# Patient Record
Sex: FEMALE | Race: BLACK OR AFRICAN AMERICAN | NOT HISPANIC OR LATINO | Employment: UNEMPLOYED | URBAN - METROPOLITAN AREA
[De-identification: names, ages, dates, MRNs, and addresses within clinical notes are randomized per-mention and may not be internally consistent; named-entity substitution may affect disease eponyms.]

---

## 2017-03-21 ENCOUNTER — HOSPITAL ENCOUNTER (EMERGENCY)
Facility: HOSPITAL | Age: 32
Discharge: HOME | End: 2017-03-21
Attending: EMERGENCY MEDICINE
Payer: MEDICAID

## 2017-03-21 VITALS
DIASTOLIC BLOOD PRESSURE: 86 MMHG | TEMPERATURE: 98 F | SYSTOLIC BLOOD PRESSURE: 121 MMHG | WEIGHT: 280 LBS | OXYGEN SATURATION: 97 % | RESPIRATION RATE: 20 BRPM | BODY MASS INDEX: 46.65 KG/M2 | HEIGHT: 65 IN | HEART RATE: 87 BPM

## 2017-03-21 DIAGNOSIS — M94.0 COSTOCHONDRITIS: ICD-10-CM

## 2017-03-21 DIAGNOSIS — J06.9 VIRAL URI: Primary | ICD-10-CM

## 2017-03-21 LAB
B-HCG UR QL: NEGATIVE
CTP QC/QA: YES

## 2017-03-21 PROCEDURE — 81025 URINE PREGNANCY TEST: CPT | Performed by: EMERGENCY MEDICINE

## 2017-03-21 PROCEDURE — 25000003 PHARM REV CODE 250: Performed by: PHYSICIAN ASSISTANT

## 2017-03-21 PROCEDURE — 93005 ELECTROCARDIOGRAM TRACING: CPT

## 2017-03-21 PROCEDURE — 99284 EMERGENCY DEPT VISIT MOD MDM: CPT

## 2017-03-21 RX ORDER — IBUPROFEN 600 MG/1
600 TABLET ORAL
Status: COMPLETED | OUTPATIENT
Start: 2017-03-21 | End: 2017-03-21

## 2017-03-21 RX ORDER — BENZONATATE 100 MG/1
100 CAPSULE ORAL
Status: COMPLETED | OUTPATIENT
Start: 2017-03-21 | End: 2017-03-21

## 2017-03-21 RX ORDER — BENZONATATE 100 MG/1
100 CAPSULE ORAL 3 TIMES DAILY PRN
Qty: 20 CAPSULE | Refills: 0 | Status: SHIPPED | OUTPATIENT
Start: 2017-03-21 | End: 2017-03-31

## 2017-03-21 RX ORDER — IBUPROFEN 600 MG/1
600 TABLET ORAL EVERY 6 HOURS PRN
Qty: 16 TABLET | Refills: 0 | Status: SHIPPED | OUTPATIENT
Start: 2017-03-21 | End: 2018-11-29 | Stop reason: SDUPTHER

## 2017-03-21 RX ADMIN — IBUPROFEN 600 MG: 600 TABLET, FILM COATED ORAL at 10:03

## 2017-03-21 RX ADMIN — BENZONATATE 100 MG: 100 CAPSULE ORAL at 10:03

## 2017-03-21 NOTE — ED AVS SNAPSHOT
[facility]  [place] [address]               Domo RICKETTS Gil   3/21/2017  9:35 PM   ED    Description:  Female : 1985   Department:  [facility]           Your Care was Coordinated By:     Provider Role From To    Janelle Chaves MD Attending Provider 17 1185 --    Medardo Lr PA-C Physician Assistant 17 --      Reason for Visit     Chest Pain           Diagnoses this Visit        Comments    Viral URI    -  Primary     Costochondritis           ED Disposition     None           To Do List           Follow-up Information     Schedule an appointment as soon as possible for a visit in 3 days to follow up.    Why:  For follow-up care        Go to [facility].    Specialty:  Emergency Medicine    Why:  If symptoms worsen    Contact information:    [place] [id number] [phone]       These Medications        Disp Refills Start End    benzonatate (TESSALON) 100 MG capsule 20 capsule 0 3/21/2017 3/31/2017    Take 1 capsule (100 mg total) by mouth 3 (three) times daily as needed for Cough. - Oral    ibuprofen (ADVIL,MOTRIN) 600 MG tablet 16 tablet 0 3/21/2017     Take 1 tablet (600 mg total) by mouth every 6 (six) hours as needed for Pain. - Oral      [facility]     [facility] -  Assistance  Registered nurses in the [facility] provide clinical advisement, health education, appointment booking, and other advisory services.  Call for this free service at [phone].             Medications           Message regarding Medications     Verify the changes and/or additions to your medication regime listed below are the same as discussed with your clinician today.  If any of these changes or additions are incorrect, please notify your healthcare provider.        START taking these NEW medications        Refills    benzonatate  "(TESSALON) 100 MG capsule 0    Sig: Take 1 capsule (100 mg total) by mouth 3 (three) times daily as needed for Cough.    Class: Print    Route: Oral    ibuprofen (ADVIL,MOTRIN) 600 MG tablet 0    Sig: Take 1 tablet (600 mg total) by mouth every 6 (six) hours as needed for Pain.    Class: Print    Route: Oral      These medications were administered today        Dose Freq    benzonatate capsule 100 mg 100 mg ED 1 Time    Sig: Take 1 capsule (100 mg total) by mouth ED 1 Time.    Class: Normal    Route: Oral    Cosign for Ordering: Required by Janelle Chaves MD    ibuprofen tablet 600 mg 600 mg ED 1 Time    Sig: Take 1 tablet (600 mg total) by mouth ED 1 Time.    Class: Normal    Route: Oral    Cosign for Ordering: Required by Janelle Chaves MD           Verify that the below list of medications is an accurate representation of the medications you are currently taking.  If none reported, the list may be blank. If incorrect, please contact your healthcare provider. Carry this list with you in case of emergency.           Current Medications     benzonatate (TESSALON) 100 MG capsule Take 1 capsule (100 mg total) by mouth 3 (three) times daily as needed for Cough.    ibuprofen (ADVIL,MOTRIN) 600 MG tablet Take 1 tablet (600 mg total) by mouth every 6 (six) hours as needed for Pain.           Clinical Reference Information           Your Vitals Were     BP Pulse Temp Resp Height Weight    175/77 88 98.8 °F (37.1 °C) (Oral) 20 5' 5" (1.651 m) 127 kg (280 lb)    SpO2 BMI             100% 46.59 kg/m2         Allergies as of 3/21/2017     No Known Allergies      Immunizations Administered on Date of Encounter - 3/21/2017     None      ED Micro, Lab, POCT     Start Ordered       Status Ordering Provider    03/21/17 2135 03/21/17 2134    Once,   Status:  Canceled      Canceled     03/21/17 2135 03/21/17 2134    Once,   Status:  Canceled      Canceled     03/21/17 2135 03/21/17 2134    Once,   Status:  Canceled      Canceled  "    03/21/17 2135 03/21/17 2134    Once,   Status:  Canceled      Canceled     03/21/17 2135 03/21/17 2134    Once,   Status:  Canceled      Canceled     03/21/17 2135 03/21/17 2134  POCT urine pregnancy  Once     Comments:  For women of childbearing age w/o hysterectomy.    Final result       ED Imaging Orders     Start Ordered       Status Ordering Provider    03/21/17 2135 03/21/17 2134  X-Ray Chest PA And Lateral  1 time imaging      Final result       Discharge References/Attachments     CHEST WALL PAIN, COSTOCHONDRITIS (ENGLISH)    URI, VIRAL, NO ABX (ADULT) (ENGLISH)      [facility] Sign-Up     Activating your [facility] account is as easy as 1-2-3!     1) Visit [email or web address], select Sign Up Now, enter this activation code and your date of birth, then select Next.  [email or web address]  Expires: 5/5/2017 10:48 PM      2) Create a username and password to use when you visit [facility] in the future and select a security question in case you lose your password and select Next.    3) Enter your e-mail address and click Sign Up!    Additional Information  If you have questions, please e-mail [email or web address] or call [phone] to talk to our [facility] staff. Remember, [facility] is NOT to be used for urgent needs. For medical emergencies, dial 911.          [facility] complies with applicable Federal civil rights laws and does not discriminate on the basis of race, color, national origin, age, disability, or sex.        Language Assistance Services     ATTENTION: Language assistance services are available, free of charge. Please call [phone].      ATENCIÓN: Si habla español, tiene a wheeler disposición servicios gratuitos de asistencia lingüística. Llame al [phone].     CHÚ Ý: N?u b?n nói Ti?ng Vi?t, có các d?ch v? h? tr? ngôn ng? mi?n phí dành cho b?n. G?i s? [phone].

## 2017-03-22 NOTE — ED PROVIDER NOTES
"Encounter Date: 3/21/2017    SCRIBE #1 NOTE: I, Simone Wilks, am scribing for, and in the presence of,  Medardo Lr PA-C. I have scribed the following portions of the note - Other sections scribed: ROS, HPI.       History     Chief Complaint   Patient presents with    Chest Pain     Pt reports having a cold x 1.5 weeks and started to have Chest pain since yesterday and SOB today      Review of patient's allergies indicates:  No Known Allergies  HPI Comments: CC: Chest Pain    HPI: Patient is a 31 y.o. F with no pertinent past medical history who presents to the ED for evaluation of acute onset, intermittent, "sharp" midsternal chest pain worse with coughing and improved by lying down x1 day and shortness of breath that began at rest. Pain is moderate and constant. No attempted treatment. She denies BLE pain, BLE edema, back pain, and/or neck pain. She reports having "a cold" x1.5 weeks. No oral contraception. No PMHx DVT, CAD, HTN. Patient is a non-smoker.      The history is provided by the patient. No  was used.     History reviewed. No pertinent past medical history.  History reviewed. No pertinent surgical history.  History reviewed. No pertinent family history.  Social History   Substance Use Topics    Smoking status: Never Smoker    Smokeless tobacco: None    Alcohol use No     Review of Systems   Constitutional: Negative for chills, diaphoresis and fever.   HENT: Negative for sore throat.    Eyes: Negative for redness.   Respiratory: Positive for cough (productive; clear sputum) and shortness of breath.    Cardiovascular: Positive for chest pain (midsternal). Negative for leg swelling.   Gastrointestinal: Negative for abdominal pain, diarrhea, nausea and vomiting.   Genitourinary: Negative for dysuria.   Musculoskeletal: Negative for back pain and neck pain.   Skin: Negative for rash.   Neurological: Negative for weakness, numbness and headaches.       Physical Exam   Initial Vitals "   BP Pulse Resp Temp SpO2   03/21/17 2100 03/21/17 2100 03/21/17 2100 03/21/17 2100 03/21/17 2100   175/77 88 20 98.8 °F (37.1 °C) 100 %     Physical Exam    Vitals reviewed.  Constitutional: She appears well-developed and well-nourished. She is not diaphoretic. No distress.   HENT:   Head: Normocephalic and atraumatic.   Right Ear: External ear normal.   Left Ear: External ear normal.   Nose: Nose normal.   Mouth/Throat: Oropharynx is clear and moist. No oropharyngeal exudate.   Eyes: Conjunctivae are normal. No scleral icterus.   Neck: Normal range of motion. Neck supple.   Cardiovascular: Normal rate, regular rhythm, normal heart sounds and intact distal pulses.   Pulmonary/Chest: Breath sounds normal. No respiratory distress. She has no wheezes. She has no rhonchi. She has no rales. She exhibits tenderness (midsternal).   Abdominal: Soft. She exhibits no distension and no mass. There is no tenderness. There is no rebound and no guarding.   Musculoskeletal: Normal range of motion. She exhibits tenderness. She exhibits no edema.   Midsternal, reproducible chest pain with palpation.   Lymphadenopathy:     She has no cervical adenopathy.   Neurological: She is alert and oriented to person, place, and time.   Skin: Skin is warm and dry.         ED Course   Procedures  Labs Reviewed   POCT URINE PREGNANCY             Medical Decision Making:   History:   Old Medical Records: I decided to obtain old medical records.    31-year-old female with obesity and no diagnosed medical history complains of cough x1.5 weeks with intermittent sternal chest pain with cough since yesterday.  She denies fever, leg pain or edema.  She presents well-appearing in no distress, afebrile, and hypertensive 175/77.  HEENT exam is unremarkable.  Lungs sounds are clear and equal with normal work of breathing.  Heart sounds are normal with no murmurs.  No JVD or peripheral edema.  She has mild sternal tenderness on palpation.  Abdomen soft  nontender.  EKG obtained shows normal sinus rhythm with T-wave inversions in leads 2 and aVF, consistent with previous EKGs.  No ST elevation or depression.  Normal axis and intervals.  Patient has no cardiac risk factors.  She is PERC negative.  Chest x-ray obtained shows normal cardiac silhouette and mediastinum with.  No evidence of pneumonia or pulmonary edema.  I doubt ACS, PE, pneumonia, pneumothorax, pericarditis, viral myocarditis.  Patient likely has costochondritis secondary to cough.  Will treat with NSAIDs and Tessalon for cough.  Patient discharged with return precautions and advised to follow-up with PCP.  Case discussed with Dr. Chaves.          Scribe Attestation:   Scribe #1: I performed the above scribed service and the documentation accurately describes the services I performed. I attest to the accuracy of the note.    Attending Attestation:           Physician Attestation for Scribe:  Physician Attestation Statement for Scribe #1: I, Medardo Lr PA-C, reviewed documentation, as scribed by Simone Wilks in my presence, and it is both accurate and complete.                 ED Course     Clinical Impression:   The primary encounter diagnosis was Viral URI. A diagnosis of Costochondritis was also pertinent to this visit.          Medardo Lr PA-C  03/22/17 0025

## 2017-03-22 NOTE — ED TRIAGE NOTES
Patient states she had a cold for 1 1/2 week with coughing. Patient states she started to have chest pain and shortness of breath yesterday. Patient describes her pain as sharp that comes and goes.

## 2017-08-28 ENCOUNTER — HOSPITAL ENCOUNTER (EMERGENCY)
Facility: HOSPITAL | Age: 32
Discharge: HOME | End: 2017-08-28
Attending: EMERGENCY MEDICINE
Payer: MEDICAID

## 2017-08-28 VITALS
OXYGEN SATURATION: 98 % | TEMPERATURE: 99 F | DIASTOLIC BLOOD PRESSURE: 63 MMHG | WEIGHT: 278 LBS | HEART RATE: 91 BPM | RESPIRATION RATE: 20 BRPM | HEIGHT: 66 IN | SYSTOLIC BLOOD PRESSURE: 125 MMHG | BODY MASS INDEX: 44.68 KG/M2

## 2017-08-28 DIAGNOSIS — S09.90XA HEAD INJURY DUE TO TRAUMA, INITIAL ENCOUNTER: ICD-10-CM

## 2017-08-28 DIAGNOSIS — R51.9 LEFT TEMPORAL HEADACHE: Primary | ICD-10-CM
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
## 2017-08-28 LAB
B-HCG UR QL: NEGATIVE
CTP QC/QA: YES

## 2017-08-28 PROCEDURE — 99284 EMERGENCY DEPT VISIT MOD MDM: CPT | Mod: 25

## 2017-08-28 PROCEDURE — 81025 URINE PREGNANCY TEST: CPT | Performed by: NURSE PRACTITIONER
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
## 2018-11-29 ENCOUNTER — HOSPITAL ENCOUNTER (EMERGENCY)
Facility: HOSPITAL | Age: 33
Discharge: HOME | End: 2018-11-29
Attending: EMERGENCY MEDICINE
Payer: MEDICAID

## 2018-11-29 VITALS
RESPIRATION RATE: 18 BRPM | TEMPERATURE: 99 F | OXYGEN SATURATION: 100 % | DIASTOLIC BLOOD PRESSURE: 65 MMHG | HEART RATE: 81 BPM | HEIGHT: 65 IN | BODY MASS INDEX: 46.65 KG/M2 | WEIGHT: 280 LBS | SYSTOLIC BLOOD PRESSURE: 116 MMHG

## 2018-11-29 DIAGNOSIS — R07.9 CHEST PAIN: ICD-10-CM

## 2018-11-29 DIAGNOSIS — R07.89 CHEST PAIN, NON-CARDIAC: Primary | ICD-10-CM

## 2018-11-29 PROCEDURE — 25000003 PHARM REV CODE 250: Performed by: EMERGENCY MEDICINE

## 2018-11-29 PROCEDURE — 93010 ELECTROCARDIOGRAM REPORT: CPT | Mod: ,,, | Performed by: INTERNAL MEDICINE

## 2018-11-29 PROCEDURE — 99284 EMERGENCY DEPT VISIT MOD MDM: CPT | Mod: 25

## 2018-11-29 PROCEDURE — 63600175 PHARM REV CODE 636 W HCPCS: Performed by: EMERGENCY MEDICINE

## 2018-11-29 PROCEDURE — 93005 ELECTROCARDIOGRAM TRACING: CPT

## 2018-11-29 RX ORDER — KETOROLAC TROMETHAMINE 30 MG/ML
30 INJECTION, SOLUTION INTRAMUSCULAR; INTRAVENOUS
Status: DISCONTINUED | OUTPATIENT
Start: 2018-11-29 | End: 2018-11-29

## 2018-11-29 RX ORDER — CYCLOBENZAPRINE HCL 10 MG
10 TABLET ORAL 3 TIMES DAILY PRN
Qty: 15 TABLET | Refills: 0 | Status: SHIPPED | OUTPATIENT
Start: 2018-11-29 | End: 2018-12-04

## 2018-11-29 RX ORDER — IBUPROFEN 600 MG/1
600 TABLET ORAL EVERY 6 HOURS PRN
Qty: 20 TABLET | Refills: 0 | Status: SHIPPED | OUTPATIENT
Start: 2018-11-29

## 2018-11-29 RX ORDER — IBUPROFEN 600 MG/1
600 TABLET ORAL
Status: COMPLETED | OUTPATIENT
Start: 2018-11-29 | End: 2018-11-29

## 2018-11-29 RX ADMIN — IBUPROFEN 600 MG: 600 TABLET, FILM COATED ORAL at 11:11

## 2018-11-29 NOTE — ED TRIAGE NOTES
"Pt here for right sided chest pain that began today. Pt reports feeling "heart racing" yesterday; resolved. Denies n/v/d, sob. Denies radiating.   "

## 2018-11-29 NOTE — DISCHARGE INSTRUCTIONS
Ibuprofen and Flexeril as directed.  Please return immediately if you get worse or if new problems develop.  Please follow-up with your primary care doctor this week.  Rest.

## 2018-11-29 NOTE — ED PROVIDER NOTES
"Encounter Date: 11/29/2018    SCRIBE #1 NOTE: I, Sunita Gonzalez, am scribing for, and in the presence of,  Doc Loera MD. I have scribed the following portions of the note - Other sections scribed: HPI, ROS.       History     Chief Complaint   Patient presents with    Chest Pain     reports "I been having a rapid heart rate for a day now. I thought it was anxiety and today I woke up with chest pain. I think it was anxiety because I just had a wedding." constant right side aching/tight chest pain since awakening from slumber; denies cough; denies trauma     CC: Chest Pain    HPI: This is a 34 y/o female with no pertinent PMHx who presents to the ED for emergent evaluation of acute onset well localized right upper sternum border chest pain that began today. Pt denies left sided chest pain. Pt rates pain as moderate (6/10) and constant. Pt reports that pain is worse with deep breaths, bending forward, and lying down. Pt reports palpitations yesterday that resolved, which she relates to anxiety. Pt also notes frontal headache. Pt denies hx of blood clots. Pt denies cough, SOB, fever, chills, otalgia, abdominal pain, nausea, vomiting, or diarrhea. No further symptoms reported.     Of note, pt denies smoking cigarettes or consuming EtOH. Pt denies known allergies. Pt denies being pregnant. Pt states, "I can't be pregnant, I just had my period." Pt declined pregnancy test before getting chest x-ray.      The history is provided by the patient. No  was used.     Review of patient's allergies indicates:  No Known Allergies  History reviewed. No pertinent past medical history.  Past Surgical History:   Procedure Laterality Date    ABDOMINAL SURGERY       History reviewed. No pertinent family history.  Social History     Tobacco Use    Smoking status: Never Smoker    Smokeless tobacco: Never Used   Substance Use Topics    Alcohol use: No    Drug use: No     Review of Systems   Constitutional: " Negative for chills and fever.   HENT: Negative for congestion, ear pain, rhinorrhea and sore throat.    Eyes: Negative for pain and visual disturbance.   Respiratory: Negative for cough and shortness of breath.    Cardiovascular: Positive for chest pain (well localized right upper sternum border).   Gastrointestinal: Negative for abdominal pain, diarrhea, nausea and vomiting.   Genitourinary: Negative for dysuria.   Musculoskeletal: Negative for back pain and neck pain.   Skin: Negative for rash.   Neurological: Positive for headaches (frontal).   All other systems reviewed and are negative.      Physical Exam     Initial Vitals [11/29/18 0823]   BP Pulse Resp Temp SpO2   135/63 80 20 98.6 °F (37 °C) 98 %      MAP       --         Physical Exam  The patient was examined specifically for the following:   General:No significant distress, Good color, Warm and dry. Head and neck:Scalp atraumatic, Neck supple. Neurological:Appropriate conversation, Gross motor deficits. Eyes:Conjugate gaze, Clear corneas. ENT: No epistaxis. Cardiac: Regular rate and rhythm, Grossly normal heart tones. Pulmonary: Wheezing, Rales. Gastrointestinal: Abdominal tenderness, Abdominal distention. Musculoskeletal: Extremity deformity, Apparent pain with range of motion of the joints. Skin: Rash.   The findings on examination were normal except for the following:  Patient's heart rate is 80.  The patient has regular rate and rhythm. Heart tones are normal. The chest is nontender. Distraction of the right arm anteriorly across the front of the body cause the patient to wince and cry out with pain. There is no rash.  The lungs are clear with equal breath sounds bilaterally.  The abdomen is nontender.  The patient is morbidly obese.   ED Course   Procedures  Labs Reviewed   POCT URINE PREGNANCY     EKG Readings: (Independently Interpreted)   This patient is in a normal sinus rhythm with a heart rate of 79.  The p.r. QRS and QT intervals are normal.   There is no definite evidence of acute myocardial infarction or malignant arrhythmia.       Imaging Results          X-Ray Chest PA And Lateral (Final result)  Result time 11/29/18 09:33:42    Final result by Nick Wilson MD (11/29/18 09:33:42)                 Impression:      Normal chest      Electronically signed by: Nick Wilson MD  Date:    11/29/2018  Time:    09:33             Narrative:    EXAMINATION:  XR CHEST PA AND LATERAL    CLINICAL HISTORY:  Chest pain, unspecified    TECHNIQUE:  PA and lateral views of the chest were performed.    COMPARISON:  The 03/21/2017    FINDINGS:  The heart size and the pulmonary vessels are normal.  Mediastinal contour is normal.  Lungs are clear.  No pleural fluid or pneumothorax.  Skeletal structures are intact.                              Medical decision making:  Given the above this patient has severe pain in her right pectoral chest worse with arm movement.  He can be reproduced during the physical examination distracting the right arm across the front of the body.  I believe that this is chest wall pain.  Chest x-ray is unremarkable EKG is unremarkable.  Patient's vital signs are stable.  The patient is young and healthy.  She is not short of breath. I doubt pulmonary embolus there is no pain or swelling lower extremities.  I will discharge her on ibuprofen to follow up with primary care.                 Scribe Attestation:   Scribe #1: I performed the above scribed service and the documentation accurately describes the services I performed. I attest to the accuracy of the note.    Attending Attestation:           Physician Attestation for Scribe:  Physician Attestation Statement for Scribe #1: I, Doc Loera MD, reviewed documentation, as scribed by Sunita Gonzalez in my presence, and it is both accurate and complete.                    Clinical Impression:   The primary encounter diagnosis was Chest pain, non-cardiac. A diagnosis of Chest pain  was also pertinent to this visit.                             oDc Loera MD  11/29/18 2841

## 2019-09-01 ENCOUNTER — HOSPITAL ENCOUNTER (EMERGENCY)
Facility: HOSPITAL | Age: 34
Discharge: HOME | End: 2019-09-01
Attending: EMERGENCY MEDICINE
Payer: MEDICAID

## 2019-09-01 VITALS
HEIGHT: 66 IN | OXYGEN SATURATION: 98 % | DIASTOLIC BLOOD PRESSURE: 65 MMHG | BODY MASS INDEX: 46.61 KG/M2 | TEMPERATURE: 98 F | RESPIRATION RATE: 19 BRPM | WEIGHT: 290 LBS | HEART RATE: 79 BPM | SYSTOLIC BLOOD PRESSURE: 120 MMHG

## 2019-09-01 DIAGNOSIS — L02.211 ABDOMINAL WALL ABSCESS: Primary | ICD-10-CM

## 2019-09-01 PROCEDURE — 10061 I&D ABSCESS COMP/MULTIPLE: CPT

## 2019-09-01 PROCEDURE — 99283 EMERGENCY DEPT VISIT LOW MDM: CPT | Mod: 25,ER

## 2019-09-01 PROCEDURE — 10060 I&D ABSCESS SIMPLE/SINGLE: CPT | Mod: ER

## 2019-09-01 PROCEDURE — 25000003 PHARM REV CODE 250: Mod: ER | Performed by: EMERGENCY MEDICINE

## 2019-09-01 RX ORDER — OXYCODONE AND ACETAMINOPHEN 5; 325 MG/1; MG/1
1 TABLET ORAL EVERY 4 HOURS PRN
Qty: 10 TABLET | Refills: 0 | Status: SHIPPED | OUTPATIENT
Start: 2019-09-01

## 2019-09-01 RX ORDER — LIDOCAINE HYDROCHLORIDE AND EPINEPHRINE 10; 10 MG/ML; UG/ML
1 INJECTION, SOLUTION INFILTRATION; PERINEURAL ONCE
Status: COMPLETED | OUTPATIENT
Start: 2019-09-01 | End: 2019-09-01

## 2019-09-01 RX ORDER — IBUPROFEN 400 MG/1
800 TABLET ORAL
Status: COMPLETED | OUTPATIENT
Start: 2019-09-01 | End: 2019-09-01

## 2019-09-01 RX ORDER — SULFAMETHOXAZOLE AND TRIMETHOPRIM 800; 160 MG/1; MG/1
1 TABLET ORAL
Status: COMPLETED | OUTPATIENT
Start: 2019-09-01 | End: 2019-09-01

## 2019-09-01 RX ORDER — SULFAMETHOXAZOLE AND TRIMETHOPRIM 800; 160 MG/1; MG/1
1 TABLET ORAL 2 TIMES DAILY
Qty: 14 TABLET | Refills: 0 | Status: SHIPPED | OUTPATIENT
Start: 2019-09-01 | End: 2019-09-08

## 2019-09-01 RX ORDER — IBUPROFEN 800 MG/1
800 TABLET ORAL EVERY 6 HOURS PRN
Qty: 20 TABLET | Refills: 0 | Status: SHIPPED | OUTPATIENT
Start: 2019-09-01

## 2019-09-01 RX ADMIN — LIDOCAINE HYDROCHLORIDE,EPINEPHRINE BITARTRATE 1 ML: 10; .01 INJECTION, SOLUTION INFILTRATION; PERINEURAL at 09:09

## 2019-09-01 RX ADMIN — IBUPROFEN 800 MG: 400 TABLET ORAL at 09:09

## 2019-09-01 RX ADMIN — SULFAMETHOXAZOLE AND TRIMETHOPRIM 1 TABLET: 800; 160 TABLET ORAL at 09:09

## 2019-09-01 NOTE — DISCHARGE INSTRUCTIONS
See your doctor or return to the emergency room in 2 days for a recheck.  Return sooner if your symptoms worsen.

## 2019-09-01 NOTE — ED PROVIDER NOTES
Encounter Date: 9/1/2019    SCRIBE #1 NOTE: I, Satinder Galindo, am scribing for, and in the presence of,  Dr. Bravo. I have scribed the following portions of the note - Other sections scribed: HPI, ROS, PE.       History     Chief Complaint   Patient presents with    Abscess     to lower abd, onset 2 days, denies drainage     33 year old female complaining of painful boil to lower abdomen x 2 days. She denies fever or drainage.  Patient reports having similar problem 4-5 months ago in which it went away on its own. She has not taken any medication for pain.     The history is provided by the patient.     Review of patient's allergies indicates:  No Known Allergies  History reviewed. No pertinent past medical history.  Past Surgical History:   Procedure Laterality Date    ABDOMINAL SURGERY       History reviewed. No pertinent family history.  Social History     Tobacco Use    Smoking status: Never Smoker    Smokeless tobacco: Never Used   Substance Use Topics    Alcohol use: No    Drug use: No     Review of Systems   Constitutional: Negative for fever.   Gastrointestinal: Positive for abdominal pain (due to boil). Negative for vomiting.        Painful boil to lower abdomen  No drainage   Skin: Positive for color change.        Abscess       Physical Exam     Initial Vitals [09/01/19 0844]   BP Pulse Resp Temp SpO2   138/81 92 19 98.5 °F (36.9 °C) 99 %      MAP       --         Physical Exam    Nursing note and vitals reviewed.  Constitutional: She appears well-developed and well-nourished. She is Obese .   HENT:   Head: Normocephalic and atraumatic.   Right Ear: External ear normal.   Left Ear: External ear normal.   Eyes: Conjunctivae are normal.   Neck: Neck supple.   Cardiovascular: Normal rate.   Pulmonary/Chest: No respiratory distress.   Abdominal: Soft.       Neurological: She is alert and oriented to person, place, and time.   Skin: Skin is warm and dry.   Psychiatric: She has a normal mood and  affect.         ED Course   I & D - Incision and Drainage  Date/Time: 9/1/2019 9:01 AM  Performed by: Abby Bravo MD  Authorized by: Abby Bravo MD   Consent Done: Yes  Consent: Verbal consent obtained.  Risks and benefits: risks, benefits and alternatives were discussed  Consent given by: patient  Patient understanding: patient states understanding of the procedure being performed  Patient identity confirmed: verbally with patient  Type: abscess  Body area: trunk  Location details: abdomen    Anesthesia:  Local Anesthetic: lidocaine 1% with epinephrine  Anesthetic total: 2 mL  Patient sedated: no  Scalpel size: 11  Incision type: single straight  Complexity: complex  Drainage: pus and  bloody  Drainage amount: copious  Wound treatment: wound packed,  drainage and  incision  Packing material: 1/4 in iodoform gauze  Complications: No  Patient tolerance: Patient tolerated the procedure well with no immediate complications        Labs Reviewed - No data to display       Imaging Results    None          Medical Decision Making:   History:   Old Medical Records: I decided to obtain old medical records.  Initial Assessment:   33 year old female with boil to lower abdomen. Patient reports pain, but denies drainage or fever. Physical exam is significant for area of tenderness, swelling, and fluctuance to lower left side of abdomen.  ED Management:  Will treat with lidocaine-EPINEPHrine 1%-1:100,000 injection 1 mL, sulfamethoxazole-trimethoprim 800-160mg per tablet 1 tablet, and Ibuprofen tablet 800 mg.  Patient was instructed to return in 2 days for recheck or sooner if her symptoms worsen.  She will be discharged on Percocet and Bactrim.                Scribe Attestation:   Scribe #1: I performed the above scribed service and the documentation accurately describes the services I performed. I attest to the accuracy of the note.       I, Dr. Abby Bravo, personally performed the services described in this  documentation. All medical record entries made by the scribe were at my direction and in my presence.  I have reviewed the chart and agree that the record reflects my personal performance and is accurate and complete. Abby Bravo MD.  12:03 PM 09/01/2019             Clinical Impression:     1. Abdominal wall abscess                                   Abby Bravo MD  09/01/19 2759

## 2019-09-03 ENCOUNTER — HOSPITAL ENCOUNTER (EMERGENCY)
Facility: HOSPITAL | Age: 34
Discharge: HOME | End: 2019-09-03
Attending: EMERGENCY MEDICINE
Payer: MEDICAID

## 2019-09-03 VITALS
BODY MASS INDEX: 46.61 KG/M2 | DIASTOLIC BLOOD PRESSURE: 68 MMHG | SYSTOLIC BLOOD PRESSURE: 156 MMHG | OXYGEN SATURATION: 100 % | TEMPERATURE: 99 F | HEART RATE: 94 BPM | RESPIRATION RATE: 16 BRPM | HEIGHT: 66 IN | WEIGHT: 290 LBS

## 2019-09-03 DIAGNOSIS — Z51.89 WOUND CHECK, ABSCESS: Primary | ICD-10-CM

## 2019-09-03 PROCEDURE — 99281 EMR DPT VST MAYX REQ PHY/QHP: CPT | Mod: ER

## 2019-09-03 NOTE — ED PROVIDER NOTES
Encounter Date: 9/3/2019    SCRIBE #1 NOTE: I, Emily Cai, am scribing for, and in the presence of,  Dr. Cuenca. I have scribed the following portions of the note - Other sections scribed: HPI, ROS, PE.       History   No chief complaint on file.    Domo Cordero is a 33 y.o. female who presents to the ED for a wound check and packing removal from an abscess on the suprapubic area.  Pt reports the abscess wound is improving and has been taking antibiotics as prescribed.    The history is provided by the patient. No  was used.     Review of patient's allergies indicates:  No Known Allergies  No past medical history on file.  Past Surgical History:   Procedure Laterality Date    ABDOMINAL SURGERY       No family history on file.  Social History     Tobacco Use    Smoking status: Never Smoker    Smokeless tobacco: Never Used   Substance Use Topics    Alcohol use: No    Drug use: No     Review of Systems   Constitutional: Negative.  Negative for fever.   HENT: Negative.  Negative for sore throat.    Eyes: Negative.    Respiratory: Negative.  Negative for shortness of breath.    Cardiovascular: Negative.  Negative for chest pain.   Gastrointestinal: Negative.  Negative for nausea and vomiting.   Endocrine: Negative.    Genitourinary: Negative.  Negative for dysuria.   Musculoskeletal: Negative.  Negative for myalgias.   Skin: Positive for wound (check). Negative for rash.   Allergic/Immunologic: Negative.    Neurological: Negative.  Negative for headaches.   Hematological: Negative.  Negative for adenopathy.   Psychiatric/Behavioral: Negative.  Negative for behavioral problems.   All other systems reviewed and are negative.      Physical Exam     Initial Vitals   BP Pulse Resp Temp SpO2   -- -- -- -- --      MAP       --         Physical Exam    Nursing note and vitals reviewed.  Constitutional: She appears well-developed and well-nourished.   HENT:   Head: Normocephalic and atraumatic.    Right Ear: External ear normal.   Left Ear: External ear normal.   Nose: Nose normal.   Eyes: Conjunctivae are normal.   Neck: Normal range of motion. Neck supple.   Cardiovascular: Normal rate and intact distal pulses.   Pulmonary/Chest: Effort normal. No respiratory distress.   Abdominal: Soft. There is no tenderness.   Musculoskeletal: Normal range of motion.   Neurological: She is alert and oriented to person, place, and time.   Skin: Skin is warm and dry. Capillary refill takes less than 2 seconds.        Psychiatric: She has a normal mood and affect. Her behavior is normal.         ED Course   Procedures  Labs Reviewed - No data to display       Imaging Results    None          Medical Decision Making:   History:   Old Medical Records: I decided to obtain old medical records.            Scribe Attestation:   Scribe #1: I performed the above scribed service and the documentation accurately describes the services I performed. I attest to the accuracy of the note.    This document was produced by a scribe under my direction and in my presence. I agree with the content of the note and have made any necessary edits.     Aaron Cuenca MD    09/03/2019 4:16 PM           Clinical Impression:     1. Wound check, abscess                                   Aaron Cuenca MD  09/03/19 4132

## 2019-09-05 NOTE — ADDENDUM NOTE
Encounter addended by: Garcia Castellanos on: 9/5/2019 3:04 PM   Actions taken: Charge Capture section accepted, Visit Navigator Flowsheet section accepted

## 2019-12-03 ENCOUNTER — HOSPITAL ENCOUNTER (EMERGENCY)
Facility: HOSPITAL | Age: 34
Discharge: HOME | End: 2019-12-03
Attending: EMERGENCY MEDICINE
Payer: MEDICAID

## 2019-12-03 VITALS
WEIGHT: 293 LBS | SYSTOLIC BLOOD PRESSURE: 169 MMHG | HEIGHT: 65 IN | BODY MASS INDEX: 48.82 KG/M2 | DIASTOLIC BLOOD PRESSURE: 95 MMHG | HEART RATE: 88 BPM | OXYGEN SATURATION: 98 % | RESPIRATION RATE: 19 BRPM | TEMPERATURE: 99 F

## 2019-12-03 DIAGNOSIS — M79.89 SWELLING OF FINGER, RIGHT: Primary | ICD-10-CM

## 2019-12-03 LAB
B-HCG UR QL: NEGATIVE
CTP QC/QA: YES

## 2019-12-03 PROCEDURE — 99284 EMERGENCY DEPT VISIT MOD MDM: CPT | Mod: ER

## 2019-12-03 PROCEDURE — 81025 URINE PREGNANCY TEST: CPT | Mod: ER | Performed by: EMERGENCY MEDICINE

## 2019-12-03 RX ORDER — SULFAMETHOXAZOLE AND TRIMETHOPRIM 800; 160 MG/1; MG/1
1 TABLET ORAL 2 TIMES DAILY
Qty: 14 TABLET | Refills: 0 | Status: SHIPPED | OUTPATIENT
Start: 2019-12-03 | End: 2019-12-10

## 2019-12-03 RX ORDER — CLINDAMYCIN PHOSPHATE 11.9 MG/ML
SOLUTION TOPICAL 2 TIMES DAILY
Qty: 30 ML | Refills: 0 | Status: SHIPPED | OUTPATIENT
Start: 2019-12-03 | End: 2019-12-10

## 2019-12-04 ENCOUNTER — HOSPITAL ENCOUNTER (EMERGENCY)
Facility: HOSPITAL | Age: 34
Discharge: HOME | End: 2019-12-04
Attending: EMERGENCY MEDICINE
Payer: MEDICAID

## 2019-12-04 VITALS
DIASTOLIC BLOOD PRESSURE: 88 MMHG | WEIGHT: 293 LBS | HEIGHT: 65 IN | SYSTOLIC BLOOD PRESSURE: 142 MMHG | HEART RATE: 89 BPM | RESPIRATION RATE: 18 BRPM | TEMPERATURE: 99 F | OXYGEN SATURATION: 100 % | BODY MASS INDEX: 48.82 KG/M2

## 2019-12-04 DIAGNOSIS — L03.011 PARONYCHIA OF FINGER OF RIGHT HAND: Primary | ICD-10-CM

## 2019-12-04 PROCEDURE — 99283 EMERGENCY DEPT VISIT LOW MDM: CPT | Mod: 25,ER

## 2019-12-04 PROCEDURE — 25000003 PHARM REV CODE 250: Mod: ER | Performed by: NURSE PRACTITIONER

## 2019-12-04 PROCEDURE — 10060 I&D ABSCESS SIMPLE/SINGLE: CPT | Mod: ER

## 2019-12-04 RX ORDER — AMOXICILLIN AND CLAVULANATE POTASSIUM 875; 125 MG/1; MG/1
1 TABLET, FILM COATED ORAL 2 TIMES DAILY
Qty: 14 TABLET | Refills: 0 | Status: SHIPPED | OUTPATIENT
Start: 2019-12-04

## 2019-12-04 RX ORDER — NAPROXEN 250 MG/1
500 TABLET ORAL
Status: COMPLETED | OUTPATIENT
Start: 2019-12-04 | End: 2019-12-04

## 2019-12-04 RX ORDER — LIDOCAINE HYDROCHLORIDE 10 MG/ML
5 INJECTION INFILTRATION; PERINEURAL
Status: COMPLETED | OUTPATIENT
Start: 2019-12-04 | End: 2019-12-04

## 2019-12-04 RX ADMIN — NAPROXEN 500 MG: 250 TABLET ORAL at 04:12

## 2019-12-04 RX ADMIN — LIDOCAINE HYDROCHLORIDE 5 ML: 10 INJECTION, SOLUTION INFILTRATION; PERINEURAL at 04:12

## 2019-12-04 NOTE — ED PROVIDER NOTES
Encounter Date: 12/4/2019       History     Chief Complaint   Patient presents with    Hand Pain     finger pain and infection     CC: Infected Finger    HPI: Domo Cordero, a 34 y.o. female presents to the ED with a 3 day history of gradually worsening infection to the right middle finger.  She does bite her nails.  She was seen in this ED yesterday and discharged with oral antibiotics and topical antibiotics.  She was not able to afford to fill to topical antibiotics.  She is right-hand dominant.  She reports that since yesterday the pain and swelling with the area of infection near the nail fold has gotten bigger and turned green.  She reports that is draining.  No other medications or treatment attempted.    The history is provided by the patient. No  was used.     Review of patient's allergies indicates:  No Known Allergies  History reviewed. No pertinent past medical history.  Past Surgical History:   Procedure Laterality Date    ABDOMINAL SURGERY       History reviewed. No pertinent family history.  Social History     Tobacco Use    Smoking status: Never Smoker    Smokeless tobacco: Never Used   Substance Use Topics    Alcohol use: No    Drug use: No     Review of Systems   Constitutional: Negative for fever.   Musculoskeletal: Positive for arthralgias (Right middle finger). Negative for joint swelling.   Skin: Positive for color change ( right middle finger) and wound ( right middle finger).   Neurological: Negative for numbness.   Psychiatric/Behavioral: Negative for confusion.       Physical Exam     Initial Vitals [12/04/19 1604]   BP Pulse Resp Temp SpO2   (!) 153/106 91 16 99 °F (37.2 °C) 98 %      MAP       --         Physical Exam    Nursing note and vitals reviewed.  Constitutional: She appears well-developed and well-nourished. She is not diaphoretic. She is cooperative.  Non-toxic appearance. No distress.   HENT:   Head: Normocephalic and atraumatic.   Right Ear:  External ear normal.   Left Ear: External ear normal.   Eyes: Conjunctivae and EOM are normal.   Neck: Normal range of motion. No tracheal deviation present.   Pulmonary/Chest: Effort normal. No stridor. No tachypnea and no bradypnea. No respiratory distress. She has no wheezes.   Musculoskeletal: Normal range of motion. She exhibits tenderness.        Right wrist: She exhibits no tenderness and no bony tenderness.        Right hand: She exhibits tenderness and swelling. She exhibits normal range of motion, normal capillary refill and no deformity. Normal sensation noted. Normal strength noted.   There is swelling and tenderness to the distal aspect of the right middle finger.  There is a paronychia on the radial aspect of the lateral nail fold of the right middle finger.  Some purulent drainage noted.  There is some mild erythema on the proximal and lateral nail fold.  Distal pad of the finger is soft.  No erythema over the finger pad.   Neurological: She is alert and oriented to person, place, and time. She has normal strength. No sensory deficit. Coordination normal. GCS score is 15. GCS eye subscore is 4. GCS verbal subscore is 5. GCS motor subscore is 6.   Skin: Skin is warm, dry and intact. Capillary refill takes less than 2 seconds. Abscess noted. No bruising and no rash noted. There is erythema. No cyanosis. Nails show no clubbing.   Psychiatric: She has a normal mood and affect. Her behavior is normal. Judgment and thought content normal.         ED Course   I & D - Incision and Drainage  Date/Time: 12/4/2019 4:33 PM  Performed by: SIMONA Delgado  Authorized by: Olga Lidia Jackson DO   Consent Done: Yes  Consent: Verbal consent obtained.  Risks and benefits: risks, benefits and alternatives were discussed  Consent given by: patient  Patient understanding: patient states understanding of the procedure being performed  Patient identity confirmed: verbally with patient  Type: abscess  Body area: upper  extremity  Location details: right ring finger  Anesthesia: digital block    Anesthesia:  Local Anesthetic: lidocaine 1% without epinephrine  Anesthetic total: 3 mL  Scalpel size: 11  Incision type: single straight  Complexity: simple  Drainage: pus  Drainage amount: moderate  Wound treatment: incision,  wound left open,  drainage and  expression of material  Complications: No  Estimated blood loss (mL): 1  Specimens: No  Implants: No  Patient tolerance: Patient tolerated the procedure well with no immediate complications        Labs Reviewed - No data to display       Imaging Results    None          Medical Decision Making:   History:   Old Medical Records: I decided to obtain old medical records.  Old Records Summarized: records from previous admission(s).       <> Summary of Records: Patient seen in this ED yesterday.  Discharged with Bactrim and topical antibiotics.       APC / Resident Notes:   This is an evaluation of a 34 y.o. female that presents to the Emergency Department for a paronychia. Physical Exam shows a non-toxic, afebrile, and well appearing female. There is paronychia noted to the radial aspect of the lateral nail fold with right middle finger a purulent area visualized and surrounding erythema.  Pad of the finger is soft with no erythema or induration.  There is active drainage. No tenderness over the joints of the right middle finger.  Full range of motion of all joints. Vital Signs Are Reassuring. Procedure: Abscess was drained per the procedure note.     My overall impression is Paronychia of the Finger. I considered, but at this time, do not suspect an extensive cellulitis, sepsis, or bacteremia to warrant admission.    UPT negative at visit yesterday. Patient was discharged with Bactrim.  Given the fact that she has a nail biter will change this to Augmentin.  Will advise her to discontinue Augmentin use.  D/C Meds: Augmentin. Additional D/C Information: warm compresses 10-15 minutes, 4-5  times a day to help increase wound drainage and decrease swelling. The diagnosis, treatment plan, instructions for follow-up and reevaluation with her PCP or the ED in 2 - 3 days for wound recheck as well as ED return precautions were discussed and understanding was verbalized. All questions or concerns have been addressed. PELON Chase, SIMONA-C                             Clinical Impression:       ICD-10-CM ICD-9-CM   1. Paronychia of finger of right hand L03.011 681.02         Disposition:   Disposition: Discharged  Condition: Stable                     SIMONA Delgado  12/04/19 1728

## 2019-12-04 NOTE — ED PROVIDER NOTES
Encounter Date: 12/3/2019    SCRIBE #1 NOTE: I, Satinder Galindo, am scribing for, and in the presence of,  Toussaint Battley, FNP. I have scribed the following portions of the note - Other sections scribed: HPI, ROS, PE.       History     Chief Complaint   Patient presents with    Hand Pain     pain/swelling to right middle finger/nail     34 year old female with right middle finger pain and swelling. For the past 2 days.  Denies injury. No treatments tried.  Rates the pain as a tightness.  No redness reported.  No drainage reported.    The history is provided by the patient. No  was used.     Review of patient's allergies indicates:  No Known Allergies  History reviewed. No pertinent past medical history.  Past Surgical History:   Procedure Laterality Date    ABDOMINAL SURGERY       History reviewed. No pertinent family history.  Social History     Tobacco Use    Smoking status: Never Smoker    Smokeless tobacco: Never Used   Substance Use Topics    Alcohol use: No    Drug use: No     Review of Systems   Constitutional: Negative.  Negative for fever.   HENT: Negative.  Negative for sore throat.    Eyes: Negative.    Respiratory: Negative.  Negative for shortness of breath.    Cardiovascular: Negative.  Negative for chest pain.   Gastrointestinal: Negative.  Negative for nausea and vomiting.   Endocrine: Negative.    Genitourinary: Negative.  Negative for dysuria.   Musculoskeletal: Positive for arthralgias. Negative for myalgias.   Skin: Negative.  Negative for rash.   Allergic/Immunologic: Negative.    Neurological: Negative.  Negative for weakness, numbness and headaches.   Hematological: Negative.  Negative for adenopathy.   Psychiatric/Behavioral: Negative.  Negative for behavioral problems.   All other systems reviewed and are negative.      Physical Exam     Initial Vitals [12/03/19 1829]   BP Pulse Resp Temp SpO2   (!) 169/95 88 19 98.5 °F (36.9 °C) 98 %      MAP       --          Physical Exam    Nursing note and vitals reviewed.  Constitutional: Vital signs are normal. She appears well-developed and well-nourished.   HENT:   Head: Normocephalic and atraumatic.   Right Ear: External ear normal.   Left Ear: External ear normal.   Nose: Nose normal.   Mouth/Throat: Oropharynx is clear and moist.   Eyes: Conjunctivae are normal.   Neck: Normal range of motion. Neck supple.   Cardiovascular: Normal rate, regular rhythm, normal heart sounds, intact distal pulses and normal pulses. Exam reveals no gallop, no friction rub and no decreased pulses.    No murmur heard.  Pulmonary/Chest: Effort normal and breath sounds normal. No respiratory distress. She has no wheezes. She has no rhonchi. She has no rales. She exhibits no tenderness.   Abdominal: Soft. Normal appearance and bowel sounds are normal. She exhibits no distension. There is no tenderness. There is no rigidity, no rebound and no guarding.   Musculoskeletal:        Right hand: She exhibits tenderness, disruption of two-point discrimination and swelling (Mild swelling without induration or fluctuance noted to the lateral edge of the right middle finger without prominent paronychia or felon noted.  No erythema or drainage noted.  Tenderness is noted on palpation.). She exhibits normal range of motion, no bony tenderness, normal capillary refill, no deformity and no laceration. Normal sensation noted. Decreased sensation is not present in the ulnar distribution and is not present in the medial distribution. Normal strength noted. She exhibits no finger abduction, no thumb/finger opposition and no wrist extension trouble.   Neurological: She is alert and oriented to person, place, and time. No cranial nerve deficit. GCS score is 15. GCS eye subscore is 4. GCS verbal subscore is 5. GCS motor subscore is 6.   Skin: Skin is warm and dry. Capillary refill takes less than 2 seconds. No rash noted.   Psychiatric: She has a normal mood and affect.          ED Course   Procedures  Labs Reviewed   POCT URINE PREGNANCY          Imaging Results    None          Medical Decision Making:   History:   Old Medical Records: I decided to obtain old medical records.  Initial Assessment:   Right finger swelling  Differential Diagnosis:   Paronychia, felon  Clinical Tests:   Lab Tests: Ordered and Reviewed  The following lab test(s) were unremarkable: UPT  ED Management:  Patient be discharged home on Cleocin topical solution and Bactrim with instructions to follow with her primary care provider in 2 days for wound recheck, return to the ER as needed if symptoms worsen or fail to improve.  The patient verbalized understanding of discharge instructions and treatment plan.            Scribe Attestation:   Scribe #1: I performed the above scribed service and the documentation accurately describes the services I performed. I attest to the accuracy of the note.                            Clinical Impression:     1. Swelling of finger, right                                Toussaintussaint Battley III, FN  12/03/19 1919

## 2019-12-04 NOTE — DISCHARGE INSTRUCTIONS
Please make sure to maintain to keep the area clean. Wash with soap and water. If the abscess is in an area you shave then change your razor and do not shave the area until the wound is healed. Do not share towels or other personal items. Use warm compresses 10-15 minutes, 4-5 times a day to help increase wound drainage and decrease swelling, and take your antibiotic medication as prescribed.     Please return to the Emergency Department for any new or worsening problems including: worsening of your abscess, increasing redness or redness extending further up your body, or temperature of greater than 100.4F.    Please stop taking the Bactrim.  Start taking the Augmentin.    Please follow up with your Primary Care Doctor or Return to the ED in 2-3 days for a wound check, or sooner if you wound gets worse. Your packing needs to be removed in 2 - 3 days.

## 2019-12-04 NOTE — ED TRIAGE NOTES
"Patient has infection on side of right middle finger close to nail bed. She states she was seen last night and was given prescriptions for antibiotics but cannot afford the topical cream. She states "I want someone to drain it and give me different antibiotics."   "

## 2019-12-04 NOTE — ED NOTES
Finger covered with gauze and taped in place with coban for  Protection.   Pt. To follow up with pcp as an out pt.

## 2020-03-24 ENCOUNTER — HOSPITAL ENCOUNTER (EMERGENCY)
Facility: HOSPITAL | Age: 35
Discharge: HOME | End: 2020-03-24
Attending: EMERGENCY MEDICINE
Payer: MEDICAID

## 2020-03-24 VITALS
OXYGEN SATURATION: 98 % | BODY MASS INDEX: 46.61 KG/M2 | WEIGHT: 290 LBS | HEART RATE: 89 BPM | HEIGHT: 66 IN | SYSTOLIC BLOOD PRESSURE: 156 MMHG | TEMPERATURE: 98 F | DIASTOLIC BLOOD PRESSURE: 86 MMHG | RESPIRATION RATE: 18 BRPM

## 2020-03-24 DIAGNOSIS — J06.9 VIRAL URI: Primary | ICD-10-CM

## 2020-03-24 PROCEDURE — 99282 EMERGENCY DEPT VISIT SF MDM: CPT | Mod: ER

## 2020-03-24 NOTE — ED PROVIDER NOTES
Encounter Date: 3/24/2020       History     Chief Complaint   Patient presents with    Shortness of Breath     Pt to ERwith c/o SOB and sore throat x 3-4days. Pt exposed to COVID + Pt      34 y.o. female No past medical history on file. 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 Worried that she may have covid and wants testing. She denies fever/cough        Review of patient's allergies indicates:  No Known Allergies  No past medical history on file.  Past Surgical History:   Procedure Laterality Date    ABDOMINAL SURGERY       No family history on file.  Social History     Tobacco Use    Smoking status: Never Smoker    Smokeless tobacco: Never Used   Substance Use Topics    Alcohol use: No    Drug use: No     Review of Systems   Constitutional: Negative for fever.   HENT: Negative for sore throat.    Respiratory: Negative for shortness of breath.    Cardiovascular: Negative for chest pain.   Gastrointestinal: Negative for nausea.   Genitourinary: Negative for dysuria.   Musculoskeletal: Negative for back pain.   Skin: Negative for rash.   Neurological: Negative for weakness.   Hematological: Does not bruise/bleed easily.   All other systems reviewed and are negative.      Physical Exam     Initial Vitals [03/24/20 0726]   BP Pulse Resp Temp SpO2   (!) 156/86 89 18 98.3 °F (36.8 °C) 98 %      MAP       --         Physical Exam    Nursing note and vitals reviewed.  Constitutional: She appears well-developed and well-nourished.   HENT:   Head: Normocephalic and atraumatic.   Eyes: Conjunctivae and EOM are normal. Pupils are equal, round, and reactive to light.   Neck: Normal range of motion.   Cardiovascular: Normal rate.   Pulmonary/Chest: Breath sounds normal. No respiratory distress. She has no wheezes.   Abdominal: She exhibits no distension.   Musculoskeletal: Normal range of motion.   Neurological: She is alert. No cranial nerve  deficit. GCS score is 15. GCS eye subscore is 4. GCS verbal subscore is 5. GCS motor subscore is 6.   Skin: Skin is warm and dry.   Psychiatric: She has a normal mood and affect. Thought content normal.         ED Course   Procedures  Labs Reviewed   POCT INFLUENZA A/B MOLECULAR          Imaging Results    None                             I have reviewed criteria for covid testing with pt and need to stop working/get tested if febrile. Will discharge.             Clinical Impression:       ICD-10-CM ICD-9-CM   1. Viral URI J06.9 465.9                                Chevy Martinez MD  03/24/20 1155

## 2020-03-24 NOTE — DISCHARGE INSTRUCTIONS
Thank you for coming to our Emergency Department today. It is important to remember that some problems are difficult to diagnose and may not be found during your first visit. Be sure to follow up with your primary care doctor and review any labs/imaging that was performed with them. If you do not have a primary care doctor, you may contact the one listed on your discharge paperwork or you may also call the [facility] Appointment Desk at [phone] to schedule an appointment with one.     All medications may potentially have side effects and it is impossible to predict which medications may give you side effects. If you feel that you are having a negative effect of any medication you should immediately stop taking them and seek medical attention.    Return to the ER with any questions/concerns, new/concerning symptoms, worsening or failure to improve. Do not drive or make any important decisions for 24 hours if you have received any pain medications, sedatives or mood altering drugs during your ER visit.

## 2020-04-03 ENCOUNTER — HOSPITAL ENCOUNTER (EMERGENCY)
Facility: HOSPITAL | Age: 35
Discharge: HOME | End: 2020-04-03
Attending: EMERGENCY MEDICINE
Payer: MEDICAID

## 2020-04-03 VITALS
RESPIRATION RATE: 18 BRPM | OXYGEN SATURATION: 99 % | HEART RATE: 77 BPM | BODY MASS INDEX: 46.81 KG/M2 | WEIGHT: 290 LBS | TEMPERATURE: 99 F | SYSTOLIC BLOOD PRESSURE: 133 MMHG | DIASTOLIC BLOOD PRESSURE: 71 MMHG

## 2020-04-03 DIAGNOSIS — R06.02 SHORTNESS OF BREATH: ICD-10-CM

## 2020-04-03 DIAGNOSIS — J01.90 ACUTE RHINOSINUSITIS: Primary | ICD-10-CM

## 2020-04-03 DIAGNOSIS — R05.9 COUGH: ICD-10-CM

## 2020-04-03 LAB
B-HCG UR QL: NEGATIVE
CTP QC/QA: YES

## 2020-04-03 PROCEDURE — 81025 URINE PREGNANCY TEST: CPT | Mod: ER | Performed by: NURSE PRACTITIONER

## 2020-04-03 PROCEDURE — 99284 EMERGENCY DEPT VISIT MOD MDM: CPT | Mod: 25,ER

## 2020-04-03 RX ORDER — LEVOCETIRIZINE DIHYDROCHLORIDE 5 MG/1
5 TABLET, FILM COATED ORAL NIGHTLY
Qty: 30 TABLET | Refills: 0 | Status: SHIPPED | OUTPATIENT
Start: 2020-04-03 | End: 2021-04-03

## 2020-04-03 RX ORDER — IPRATROPIUM BROMIDE 21 UG/1
2 SPRAY, METERED NASAL 3 TIMES DAILY
Qty: 30 ML | Refills: 0 | Status: SHIPPED | OUTPATIENT
Start: 2020-04-03

## 2020-04-03 NOTE — DISCHARGE INSTRUCTIONS
Take Xyzal at bedtime.  Use the Atrovent nasal spray three times per day.  Be sure you are drinking plenty of water.  You can go to a drive-thru testing location if you want to be tested for corona virus.  Return to the ER if your shortness of breath worsens.

## 2020-04-03 NOTE — ED PROVIDER NOTES
"Encounter Date: 4/3/2020       History     Chief Complaint   Patient presents with    URI     Pt states," I have a little cough, I have been short of breath for two days."     Patient is a 33 yo F who denies significant PMHx and presents today for evaluation of cough and shortness of breath. She reports a dry cough with shortness of breath on exertion and feeling "something stuck in her chest." She also reports sinus problems for the last three weeks. She complains of congestion, post nasal drip, sinus pressure, scratchy throat. She denies wheezing, fevers, chills, body aches, nausea, vomiting, diarrhea, abdominal pain, or loss of taste and smell. She is out of her allergy medication. 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 She has not tried OTC medication for symptoms. She has no additional complaints and is otherwise healthy on today's visit.        Review of patient's allergies indicates:  No Known Allergies  History reviewed. No pertinent past medical history.  Past Surgical History:   Procedure Laterality Date    ABDOMINAL SURGERY       History reviewed. No pertinent family history.  Social History     Tobacco Use    Smoking status: Never Smoker    Smokeless tobacco: Never Used   Substance Use Topics    Alcohol use: No    Drug use: No     Review of Systems   Constitutional: Negative for activity change, chills, diaphoresis, fatigue and fever.   HENT: Positive for congestion, postnasal drip, rhinorrhea, sinus pressure and sneezing. Negative for ear pain, sinus pain and sore throat.    Respiratory: Positive for cough, chest tightness and shortness of breath. Negative for wheezing.    Cardiovascular: Negative for chest pain, palpitations and leg swelling.   Gastrointestinal: Negative for abdominal pain, diarrhea, nausea and vomiting.   Genitourinary: Negative for dysuria.   Musculoskeletal: Negative for back pain.   Skin: Negative for rash.   Neurological: " Negative for weakness.   Hematological: Does not bruise/bleed easily.       Physical Exam     Initial Vitals [04/03/20 1427]   BP Pulse Resp Temp SpO2   135/79 100 16 98.3 °F (36.8 °C) 98 %      MAP       --         Physical Exam    Constitutional: She appears well-developed and well-nourished.   HENT:   Head: Normocephalic and atraumatic.   Right Ear: Tympanic membrane, external ear and ear canal normal.   Left Ear: Tympanic membrane, external ear and ear canal normal.   Nose: Mucosal edema and rhinorrhea present. Right sinus exhibits no maxillary sinus tenderness and no frontal sinus tenderness. Left sinus exhibits no maxillary sinus tenderness and no frontal sinus tenderness.   Mouth/Throat: Mucous membranes are normal. Posterior oropharyngeal erythema present. No oropharyngeal exudate, posterior oropharyngeal edema or tonsillar abscesses.       Cardiovascular: Normal rate, regular rhythm and normal heart sounds. Exam reveals no gallop and no friction rub.    No murmur heard.  Pulmonary/Chest: Breath sounds normal. No respiratory distress. She has no wheezes. She has no rhonchi. She has no rales.   Lymphadenopathy:     She has no cervical adenopathy.   Neurological: She is alert and oriented to person, place, and time. GCS eye subscore is 4. GCS verbal subscore is 5. GCS motor subscore is 6.   Skin: Skin is warm and dry.   Psychiatric: She has a normal mood and affect.         ED Course   Procedures  Labs Reviewed - No data to display       Imaging Results    None                APC / Resident Notes:   This is an evaluation of a 34 y.o. female that presents to the Emergency Department for URI symptoms. The patient is a non-toxic, afebrile, and well appearing female. On physical exam: Appears well hydrated with moist mucus membranes. Neck soft and supple with no meningeal signs or cervical lymphadenopathy. Breath sounds are clear and equal bilaterally with no adventitious breath sounds, tachypnea or respiratory  distress with room air pulse ox of 98% and no evidence of hypoxia or increased work of breathing. No respiratory distress. Vital Signs Are Reassuring. RESULTS:  CXR: negative with no acute cardiopulmonary process.      NO COVID: Patient was not tested for COVID due to the patient not being immunosuppressed, and organ transplant, hemodialysis, COPD, active cancer, active HIV or living in a communal setting or less than 10 weeks of age.  While patient may have had signs of acute upper respiratory infection as directed above in the HPI they did not also have a fever.  Patient was given outpatient resources on where he could be tested in the future.     My overall impression is acute rhinosinusitisI. I considered, but at this time, do not suspect OM, OE, strep pharyngitis, meningitis, pneumonia, bacterial sinusitis, or significant dehydration requiring IV fluids or admission.    D/C Meds as prescribed, Xyzal and Atrovent nasal spray. D/C Information: Tylenol/Ibuprofen PRN, Hydration. Return precautions discussed to return if patient develops fevers, SOB, or other worsening symptoms. The diagnosis, treatment plan, instructions for follow-up and reevaluation with Primary Care as well as ED return precautions were discussed and understanding was verbalized. All questions or concerns have been addressed.                                   Clinical Impression:       ICD-10-CM ICD-9-CM   1. Acute rhinosinusitis J01.90 461.9   2. Shortness of breath R06.02 786.05   3. Cough R05 786.2                                Radhika Dickerson NP  04/03/20 9429